# Patient Record
Sex: FEMALE | Race: WHITE | Employment: OTHER | URBAN - METROPOLITAN AREA
[De-identification: names, ages, dates, MRNs, and addresses within clinical notes are randomized per-mention and may not be internally consistent; named-entity substitution may affect disease eponyms.]

---

## 2021-03-22 PROBLEM — O24.410 DIET CONTROLLED GESTATIONAL DIABETES MELLITUS (GDM), ANTEPARTUM: Status: ACTIVE | Noted: 2021-03-22

## 2021-03-22 PROBLEM — Q27.0 SINGLE UMBILICAL ARTERY: Status: ACTIVE | Noted: 2021-03-22

## 2021-04-01 PROBLEM — O36.63X1 LARGE FOR GESTATIONAL AGE FETUS AFFECTING MANAGEMENT OF MOTHER, ANTEPARTUM, THIRD TRIMESTER, FETUS 1: Status: ACTIVE | Noted: 2021-04-01

## 2021-04-02 ENCOUNTER — HOSPITAL ENCOUNTER (OUTPATIENT)
Dept: DIABETES SERVICES | Age: 31
Setting detail: THERAPIES SERIES
Discharge: HOME | End: 2021-04-02
Payer: COMMERCIAL

## 2021-04-02 VITALS — WEIGHT: 150 LBS

## 2021-04-02 PROCEDURE — G0108 DIAB MANAGE TRN  PER INDIV: HCPCS

## 2021-04-02 NOTE — PROGRESS NOTES
Diabetes Self-Management Education Record    Participant Name: Carlean Schwab  Referring Provider: No primary care provider on file. Assessment/Evaluation Ratings:  1=Needs Instruction   4=Demonstrates Understanding/Competency  2=Needs Review   NC=Not Covered    3=Comprehends Key Points  N/A=Not Applicable  Topics/Learning Objectives Pre-session Assess Date:  Instructor initials/date  4/2/2021 CS Instr. Date    Instructor initials/date  4/2/2021 CS Follow-up Post- session Eval Comments   Diabetes disease process & Treatment process:   -Define type of diabetes in simple terms.  - Describe the ABCs of  diabetes management  -Identify own type of diabetes  -Identify lifestyle changes/treatment options  -other:  1 [x] All     []  []  []  []  []  3 4/2/2021 CS  Newly dx GDM, 36.5 weeks, due 4/26/2021  Family history   Developing strategies for Healthy coping/psychosocial issues:    -Describe feelings about living with diabetes  -Identify coping strategies and sources of stress  -Identify support needed & support network available   1 [x] All     []  []  []    []  3   4/2/2021 CS Pt states  and family as support. Prevention, detection & treatment of Chronic complications:    -Identify the prevention, detection and treatment for complications including immunizations, preventive eye, foot, dental and renal exams as indicated per the participant's duration of diabetes and health status.  -Define the natural course of diabetes and the relationship of blood glucose levels to long term complications of diabetes.   1 [x] All     []            []  3    Prevention, detection & treatment of acute complications:    -State the causes,signs & symptoms of hyper & hypoglycemia, and prevention & treatment strategies.   -Describe sick day guidelines  DKA /indications for ketone testing &  when to call physician  1 [] All     [x]      []    3   4/2/2021 CS - Pt states no symptoms of hyper/hypoglycemia       -Identify severe weather/situation crisis  & diabetes supplies management  []      Using medications safely:   -State effects of diabetes medicines on blood glucose levels;  -List diabetes medication taken, action & side effects  [] All     []  []      Insulin/Injectables/glucagon  -Name appropriate injection sites; proper storage; supplies needed;     []       Demonstrate proper technique  []      Monitoring blood glucose, interpreting and using results:   -Identify the purpose of testing   -Identify recommended & personal blood glucose targets & HgbA1C target levels  -State the Importance of logging blood glucose levels for pattern recognition;   -State benefits of reading/using pt generated health data  -Verbalize safe lancet disposal 1 [x] All     []  []    []  []  []  4  4/2/2021 CS  Pt states that she is monitoring 4x/day. Reviewed log sheets. Pt within target range at fasting and 2 hours after meals.    -Demonstrate proper testing technique  []      Incorporating physical activity into lifestyle:   -State effect of exercise on blood glucose levels;   -State benefits of regular exercise;   -Define safety considerations/food choices if needed.  -Describe contraindications/maintenance of activity. 1 [x] All     []  []    []  []  4 4/2/2021 CS Pt states that she walks at least 5 days/week for 45 minutes/day. Incorporating nutritional management into lifestyle:   -Describe effect of type, amount & timing of food on blood glucose  -Describe methods for preparing and planning healthy meals  -Correctly read food labels  -Name 3 foods high in Carbohydrate 1 [x] All       []    []    []  []  4 4/2/2021 CS Food recall reveals patient does not drink sugary beverages, eats 3 meal/day and may have between meal snacks. Pt able to name carbohydrate containing foods and read food labels for carbohydrate content.     Emailed patient supporting materials.    -Plan a carbohydrate-controlled meal based on individualized meal plan  -Demonstrate CHO counting/portion control  1 [x]  [x]  4 4/2/2021 CS Instructed patient on 1900 calorie carbohydrate controlled meal plan: 9 oz lean protein, 6 servings fat, 14 carbohydrate choices/day: 2 choices breakfast, am, pm, HS snack, 3 choices lunch and dinner. Emailed patient supporting materials. Pt states that she received. Pt able to plan menu items using meal planning food lists. Developing strategies for problem solving to promote health/change behavior. -Identify 7 self-care behaviors; Personal health risk factors; Benefits, challenges & strategies for behavioral change and set an individualized goal selection. 1       [x]  4  4/2/2021 CS  [x]Nutrition  [x]Monitoring  []Exercise  []Medication  []Other     Identified Barriers to learning/adherence to self management plan:    None  []  other    Instruction Method:  Lecture/Discussion, Handouts and Return demonstration     Supporting Education Materials/Equipment Provided: Meal Plan and Nutritional Packet   []Persian materials       [] services     []Other:      Encounter Type Date Attended Start Time End Time Comments No Show Dates   Assessment 4/2/2021  1300 1310   telehealth    Session 1         Session 2        1:1 DSMES          In person Follow-up         Gestational Diabetes 4/2/2021 1310 1340  telehealth    Individual MNT        Meter Instrx        Insulin Instrx           Additional Comments: [] Pt seen individually due to Covid-19 Safety precautions and no group session available.         Date:   Follow-up goal attainment based on patients initial DSMES goal    Dr Notified by [] EMR []Fax        []Post class Hgb A1C  []Medication compliance   []Plate method/meal plan compliance   []Able to state the number of Carbohydrate servings eaten at B,L,D   []Testing blood glucose as prescribed by PCP   []Exercise Routine   []Other:   []Other:     []Patient lost to follow-up  Dr Notified by []EMR []Fax     Personal Support Plan: [x] Keep all scheduled doctor appointments   [] Make and keep appointments with specialists (foot, eye, dentist) as recommended   [] Consult my pharmacist about all new medications or to ask any medication questions   [] Get tested for sleep apnea   [] Seek help for:   [] Make an appointment with:   [] Attend smoking cessation classes or call 1-800-QUIT-NOW  [] Attend Diabetes Support Group   [] Use diabetes magazines, books, or credible web-sites like the ADA for more information  [] Increase exercise at home or join an exercise program:   [] Other:

## 2021-04-02 NOTE — LETTER
[facility])  - Diabetes Education    2021     Re:     Joshua Horowitz  :  1990    Dear MICHELLE Norris-EVELIO           Thank you for referring your patient, Patrice Hadley, for diabetes education. Your patient was seen on 2021,and has completed their personalized initial comprehensive education plan. The education plan included the following topics: Disease Process, Nutrition, Exercise, Blood Glucose Monitoring, Acute/chronic complications, Behavioral and Lifestyle Change, Healthy Coping and goal setting. The following services were also completed:    [x]  Diet instruction on: 1900 calorie carbohydrate controlled meal plan  9 oz lean protein, 6 servings fat, 14 carbohydrate choices/day: 2 choices breakfast, am, pm, HS snack, 3 choices lunch and dinner. Upon completion of these sessions, the diabetes teaching team made the following evaluation of your patient's progress:          ASSESSMENT    [x]  very attentive to teaching  [x]  answered questions appropriately when asked  [x]  seems able to apply concepts to daily lifestyle  [x]  seems motivated to do well  [x]  verbalized an understanding of meal plan  [x]  expresses an intent to comply with meal plan  [x]  worked out meal timing adjustment according to work/schedule/lifestyle      GOAL  [x]  To count carbohydrate servings at each meal as instructed.   [x]  to measure blood glucose   []  to inject insulin in the stomach area  [x]  to log glucose results and bring to physicians office at each scheduled appointment      DIABETES SELF-MANAGEMENT SUPPORT PLAN/REFERRALS:    []  Prescription Assistance  []  [facility]  []  [facility]  []  Financial Counselor  []    []  Dentist  []  Keep all scheduled Dr appointments  []  Smoking Cessation Classes    Thank you for referring this patient to our program.  Please do not hesitate to call if you have any questions at ( (OZZIE or FRANK) or [phone] ([facility]).         Sincerely,    [facility]) Diabetes Education Department  American Diabetes Association Recognized DSMES Program

## 2021-04-02 NOTE — PROGRESS NOTES
HIPPA Consent completed verbally for visit and copy mailed to patient. Visit provided via telehealth due to Covid-19 social distancing measures. This virtual group/initial visit was conducted via:       Video       Patient's Location:   [x] Home    [] Other        Provider Location ([facility]/State):       [x][facility]    [][place], [place], was evaluated through a synchronous (real-time) audio-video encounter. The patient (or guardian if applicable) is aware that this is a billable service. Verbal consent to proceed has been obtained today. The visit was conducted pursuant to the emergency declaration under the [facility], [address] [place] [address] authority and the [place] and [employer] General Act. Patient identification was verified, and a caregiver was present when appropriate. The patient was located in a state where the provider was credentialed to provide care.

## 2021-04-19 ENCOUNTER — HOSPITAL ENCOUNTER (INPATIENT)
Age: 31
LOS: 1 days | Discharge: HOME | End: 2021-04-20
Attending: OBSTETRICS & GYNECOLOGY | Admitting: OBSTETRICS & GYNECOLOGY
Payer: COMMERCIAL

## 2021-04-19 PROBLEM — Z3A.39 39 WEEKS GESTATION OF PREGNANCY: Status: ACTIVE | Noted: 2021-04-19

## 2021-04-19 LAB
ABO/RH: NORMAL
ANTIBODY SCREEN: NORMAL
HCT VFR BLD CALC: 34.8 % (ref 34–48)
HEMOGLOBIN: 10.7 G/DL (ref 11.5–15.5)
MCH RBC QN AUTO: 25.8 PG (ref 26–35)
MCHC RBC AUTO-ENTMCNC: 30.7 % (ref 32–34.5)
MCV RBC AUTO: 84.1 FL (ref 80–99.9)
PDW BLD-RTO: 13.5 FL (ref 11.5–15)
PLATELET # BLD: 496 E9/L (ref 130–450)
PMV BLD AUTO: 9.6 FL (ref 7–12)
RBC # BLD: 4.14 E12/L (ref 3.5–5.5)
WBC # BLD: 13.9 E9/L (ref 4.5–11.5)

## 2021-04-19 PROCEDURE — 85027 COMPLETE CBC AUTOMATED: CPT

## 2021-04-19 PROCEDURE — 86900 BLOOD TYPING SEROLOGIC ABO: CPT

## 2021-04-19 PROCEDURE — 2580000003 HC RX 258: Performed by: OBSTETRICS & GYNECOLOGY

## 2021-04-19 PROCEDURE — 6360000002 HC RX W HCPCS: Performed by: OBSTETRICS & GYNECOLOGY

## 2021-04-19 PROCEDURE — 86901 BLOOD TYPING SEROLOGIC RH(D): CPT

## 2021-04-19 PROCEDURE — 6360000002 HC RX W HCPCS

## 2021-04-19 PROCEDURE — 7200000001 HC VAGINAL DELIVERY

## 2021-04-19 PROCEDURE — 36415 COLL VENOUS BLD VENIPUNCTURE: CPT

## 2021-04-19 PROCEDURE — 1220000000 HC SEMI PRIVATE OB R&B

## 2021-04-19 PROCEDURE — 0KQM0ZZ REPAIR PERINEUM MUSCLE, OPEN APPROACH: ICD-10-PCS | Performed by: OBSTETRICS & GYNECOLOGY

## 2021-04-19 PROCEDURE — 86850 RBC ANTIBODY SCREEN: CPT

## 2021-04-19 PROCEDURE — 6370000000 HC RX 637 (ALT 250 FOR IP): Performed by: OBSTETRICS & GYNECOLOGY

## 2021-04-19 RX ORDER — SODIUM CHLORIDE 0.9 % (FLUSH) 0.9 %
5-40 SYRINGE (ML) INJECTION EVERY 12 HOURS SCHEDULED
Status: DISCONTINUED | OUTPATIENT
Start: 2021-04-19 | End: 2021-04-20 | Stop reason: HOSPADM

## 2021-04-19 RX ORDER — SODIUM CHLORIDE, SODIUM LACTATE, POTASSIUM CHLORIDE, CALCIUM CHLORIDE 600; 310; 30; 20 MG/100ML; MG/100ML; MG/100ML; MG/100ML
INJECTION, SOLUTION INTRAVENOUS CONTINUOUS
Status: DISCONTINUED | OUTPATIENT
Start: 2021-04-19 | End: 2021-04-19

## 2021-04-19 RX ORDER — HYDROCODONE BITARTRATE AND ACETAMINOPHEN 5; 325 MG/1; MG/1
1 TABLET ORAL EVERY 4 HOURS PRN
Status: DISCONTINUED | OUTPATIENT
Start: 2021-04-19 | End: 2021-04-20 | Stop reason: HOSPADM

## 2021-04-19 RX ORDER — SODIUM CHLORIDE 0.9 % (FLUSH) 0.9 %
5-40 SYRINGE (ML) INJECTION PRN
Status: DISCONTINUED | OUTPATIENT
Start: 2021-04-19 | End: 2021-04-20 | Stop reason: HOSPADM

## 2021-04-19 RX ORDER — ONDANSETRON 2 MG/ML
4 INJECTION INTRAMUSCULAR; INTRAVENOUS EVERY 6 HOURS PRN
Status: DISCONTINUED | OUTPATIENT
Start: 2021-04-19 | End: 2021-04-19

## 2021-04-19 RX ORDER — ACETAMINOPHEN 650 MG
TABLET, EXTENDED RELEASE ORAL
Status: DISCONTINUED
Start: 2021-04-19 | End: 2021-04-19

## 2021-04-19 RX ORDER — LIDOCAINE HYDROCHLORIDE 10 MG/ML
INJECTION, SOLUTION INFILTRATION; PERINEURAL
Status: DISCONTINUED
Start: 2021-04-19 | End: 2021-04-19

## 2021-04-19 RX ORDER — METHYLERGONOVINE MALEATE 0.2 MG/ML
200 INJECTION INTRAVENOUS ONCE
Status: COMPLETED | OUTPATIENT
Start: 2021-04-19 | End: 2021-04-19

## 2021-04-19 RX ORDER — PRENATAL WITH FERROUS FUM AND FOLIC ACID 3080; 920; 120; 400; 22; 1.84; 3; 20; 10; 1; 12; 200; 27; 25; 2 [IU]/1; [IU]/1; MG/1; [IU]/1; MG/1; MG/1; MG/1; MG/1; MG/1; MG/1; UG/1; MG/1; MG/1; MG/1; MG/1
1 TABLET ORAL DAILY
Status: DISCONTINUED | OUTPATIENT
Start: 2021-04-19 | End: 2021-04-20 | Stop reason: HOSPADM

## 2021-04-19 RX ORDER — FERROUS SULFATE 325(65) MG
325 TABLET ORAL 2 TIMES DAILY WITH MEALS
Status: DISCONTINUED | OUTPATIENT
Start: 2021-04-19 | End: 2021-04-20 | Stop reason: HOSPADM

## 2021-04-19 RX ORDER — IBUPROFEN 800 MG/1
800 TABLET ORAL EVERY 8 HOURS
Status: DISCONTINUED | OUTPATIENT
Start: 2021-04-19 | End: 2021-04-20 | Stop reason: HOSPADM

## 2021-04-19 RX ORDER — DOCUSATE SODIUM 100 MG/1
100 CAPSULE, LIQUID FILLED ORAL 2 TIMES DAILY
Status: DISCONTINUED | OUTPATIENT
Start: 2021-04-19 | End: 2021-04-20 | Stop reason: HOSPADM

## 2021-04-19 RX ORDER — SIMETHICONE 80 MG
80 TABLET,CHEWABLE ORAL 4 TIMES DAILY
Status: DISCONTINUED | OUTPATIENT
Start: 2021-04-19 | End: 2021-04-20 | Stop reason: HOSPADM

## 2021-04-19 RX ORDER — MODIFIED LANOLIN
OINTMENT (GRAM) TOPICAL PRN
Status: DISCONTINUED | OUTPATIENT
Start: 2021-04-19 | End: 2021-04-20 | Stop reason: HOSPADM

## 2021-04-19 RX ORDER — HYDROCODONE BITARTRATE AND ACETAMINOPHEN 5; 325 MG/1; MG/1
2 TABLET ORAL EVERY 4 HOURS PRN
Status: DISCONTINUED | OUTPATIENT
Start: 2021-04-19 | End: 2021-04-20 | Stop reason: HOSPADM

## 2021-04-19 RX ORDER — ACETAMINOPHEN 325 MG/1
650 TABLET ORAL EVERY 4 HOURS PRN
Status: DISCONTINUED | OUTPATIENT
Start: 2021-04-19 | End: 2021-04-20 | Stop reason: HOSPADM

## 2021-04-19 RX ORDER — SODIUM CHLORIDE 9 MG/ML
25 INJECTION, SOLUTION INTRAVENOUS PRN
Status: DISCONTINUED | OUTPATIENT
Start: 2021-04-19 | End: 2021-04-20 | Stop reason: HOSPADM

## 2021-04-19 RX ORDER — SODIUM CHLORIDE, SODIUM LACTATE, POTASSIUM CHLORIDE, CALCIUM CHLORIDE 600; 310; 30; 20 MG/100ML; MG/100ML; MG/100ML; MG/100ML
INJECTION, SOLUTION INTRAVENOUS CONTINUOUS
Status: DISCONTINUED | OUTPATIENT
Start: 2021-04-19 | End: 2021-04-20 | Stop reason: HOSPADM

## 2021-04-19 RX ADMIN — SODIUM CHLORIDE, POTASSIUM CHLORIDE, SODIUM LACTATE AND CALCIUM CHLORIDE: 600; 310; 30; 20 INJECTION, SOLUTION INTRAVENOUS at 06:21

## 2021-04-19 RX ADMIN — METFORMIN HYDROCHLORIDE 1 TABLET: 500 TABLET, EXTENDED RELEASE ORAL at 10:12

## 2021-04-19 RX ADMIN — METHYLERGONOVINE MALEATE 200 MCG: 0.2 INJECTION, SOLUTION INTRAMUSCULAR; INTRAVENOUS at 05:57

## 2021-04-19 RX ADMIN — DOCUSATE SODIUM 100 MG: 100 CAPSULE, LIQUID FILLED ORAL at 10:12

## 2021-04-19 RX ADMIN — Medication 909 ML: at 05:55

## 2021-04-19 RX ADMIN — BENZOCAINE AND LEVOMENTHOL: 200; 5 SPRAY TOPICAL at 10:12

## 2021-04-19 RX ADMIN — DOCUSATE SODIUM 100 MG: 100 CAPSULE, LIQUID FILLED ORAL at 20:42

## 2021-04-19 RX ADMIN — Medication: at 10:12

## 2021-04-19 RX ADMIN — ACETAMINOPHEN 650 MG: 325 TABLET ORAL at 10:12

## 2021-04-19 RX ADMIN — ACETAMINOPHEN 650 MG: 325 TABLET ORAL at 16:35

## 2021-04-19 RX ADMIN — Medication 87.3 MILLI-UNITS/MIN: at 06:06

## 2021-04-19 RX ADMIN — SODIUM CHLORIDE, PRESERVATIVE FREE 10 ML: 5 INJECTION INTRAVENOUS at 20:42

## 2021-04-19 RX ADMIN — ACETAMINOPHEN 650 MG: 325 TABLET ORAL at 20:42

## 2021-04-19 SDOH — HEALTH STABILITY: MENTAL HEALTH: HOW OFTEN DO YOU HAVE A DRINK CONTAINING ALCOHOL?: NEVER

## 2021-04-19 ASSESSMENT — PAIN SCALES - GENERAL
PAINLEVEL_OUTOF10: 9
PAINLEVEL_OUTOF10: 1
PAINLEVEL_OUTOF10: 4

## 2021-04-19 NOTE — PROGRESS NOTES
viable  boy at 36. [place] cried and suctioned at the perineum. Placed on mother abdomen for delayed cord clamping. Placed skin to skin with mother. apgars 9/9.  Mother and  stable at this time

## 2021-04-19 NOTE — FLOWSHEET NOTE
Pt. states does not have to void and does not want to go to bathroom at this time. Sat at side of bed and tolerated well,ambulated jto wheelchair and tolerated well. Transferred to [facility]. Reported off to VAUGHN Mckeon.

## 2021-04-19 NOTE — LACTATION NOTE
Experienced breastfeeding mom stated that breastfeeding is going great and that she has no questions at this time. Encouraged her to call with questions.   Leta

## 2021-04-19 NOTE — L&D DELIVERY SUMMARY NOTE
Vaginal Delivery Note    Details of Procedure: The patient is a 31 y.o. female at 39w0d   OB History        4    Para   1    Term   1            AB   2    Living   1       SAB   2    TAB        Ectopic        Molar        Multiple        Live Births   1             who was admitted for active phase labor. She received the following interventions: none She was known to be GBS negative and did not receive antibiotic prophylaxis. The patient progressed well,did not receive an epidural, became complete and started to push. After pushing for 4 times the fetal head was at the perineum, nose and mouth suctioned with bulb suction and the rest of the infant delivered atraumatically, placed on mother abdomen. Cord was clamped and cut and infant handed off to the waiting nurse for evaluation. The delivery of the placenta was spontaneous. The perineum and vagina were explored and a second degree laceration was repaired in standard fashion. Male infant delivered at 36 with Apgars of 9 and 9    Anesthesia:  none    Estimated blood loss:  300ml    Specimen:  Placenta sent to pathology     Cord blood sent Yes    Complications:  none    Condition:  infant stable to general nursery    Ron Vaughan M.D., FACOG

## 2021-04-19 NOTE — FLOWSHEET NOTE
Pt. States feelss nauseated and a little lightheaded. States shes hungry. Ginger ale and crackers given. Uterus massaged with no gush  And 1 below U. Saturated ice pack since delivery.

## 2021-04-19 NOTE — PROGRESS NOTES
Pitocin infused and stopped. lr stopped. Up to bathroom with help.voided qs. pericare given with instructions. refusing ice at this time. Dermoplast spray given. Lochia rna. Tolerated activity well. Medicated with tylenol per pt request.instructed to call for help as needed.

## 2021-04-19 NOTE — H&P
CHIEF COMPLAINT:   spontaneous premature rupture of the membranesfollowed by spontaneous onset of labor      HISTORY OF PRESENT ILLNESS:    The patient is a 31 y.o. female [id number], Patient's last menstrual period was 2020.,  at 39w0d. OB History        4    Para   1    Term   1            AB   2    Living   1       SAB   2    TAB        Ectopic        Molar        Multiple        Live Births   1            Patient presents with a chief complaint as above and is being admitted for evaluation    Estimated Due Date: Estimated Date of Delivery: 21    PRENATAL CARE:  Complicated by:   Patient Active Problem List   Diagnosis Code    Single umbilical artery M48.3    Diet controlled gestational diabetes mellitus (GDM), antepartum O24.410    Large for gestational age fetus affecting management of mother, antepartum, third trimester, fetus 1 O44.62X2    44 weeks gestation of pregnancy Z3A.39       PAST OB HISTORY  OB History        4    Para   1    Term   1            AB   2    Living   1       SAB   2    TAB        Ectopic        Molar        Multiple        Live Births   1                Past Medical History:    No past medical history on file. Past Surgical History:    No past surgical history on file. Social History:    TOBACCO:   reports that she has never smoked. She does not have any smokeless tobacco history on file. ETOH:   reports no history of alcohol use. DRUGS:   has no history on file for drug. Family History:   No family history on file. Medications Prior to Admission:  Medications Prior to Admission: Lancets MISC, Test glucose four times a day  blood glucose monitor strips, Test glucose 4x a day.   glucose monitoring kit (FREESTYLE) monitoring kit, As directed  Prenatal Vit-Fe Sulfate-FA (PRENATAL MULTIVIT-IRON PO), Take by mouth    Allergies:  Macadamia nut oil    Review of Systems:   Constitutional : No fever, no chills   HEENT: No headache, no visual changes, no rhinorrhea, no sore throat   Cardiovascular : No pain, no palpitations, no edema   Respiratory : No pain, no shortness of breath   Gastrointestinal : No N/V, no D/C, no abdominal pain   Genitourinary : No dysuria, hematuria and no incontinence   Musculoskeletal : No myalgia, No back pain  Neurological : No numbness, no tingling, no tremors. No history of seizures  All other systems were reported as negative. PHYSICAL EXAM:    General appearance:  awake, alert, cooperative, no apparent distress, and appears stated age  Neurologic:  Awake, alert, oriented to name, place and time. Lungs:  No increased work of breathing, good air exchange, clear to auscultation bilaterally, no crackles or wheezing  Heart:  Normal apical impulse, regular rate and rhythm, normal S1 and S2, no S3 or S4, and no murmur noted  Abdomen:  Her uterus is gravid. She had no complaint of abdominal pain or tenderness. The fetus is in the cephalic presentation. No contractions palpated. Fetal heart rate:  Fetal heart tracing is reassuring with a normal baseline, variability, and accelerations. No decelerations detected. Pelvis:  External Genitalia: General appearance; normal, Hair distribution; normal, Lesions absent  Cervix: see admitting nurse's exam, VE not repeated. Extremities: Mild peripheral edema is noted. /62   Pulse 93   Temp 98.7 °F (37.1 °C) (Oral)   Resp 18   Ht 5' 8\" (1.727 m)   Wt 151 lb (68.5 kg)   LMP 07/20/2020   BMI 22.96 kg/m²     General Labs:    CBC: No results found for: WBC, RBC, HGB, HCT, MCV, RDW, PLT  CMP:  No results found for: NA, K, CL, CO2, BUN, PROT, ALB  U/A:  No components found for: Kindra Annabel, USPGRAV, UPH, UPROTEIN, UGLUCOSE, UKETONE, UBILI, UBLOOD, UNITRITE, UUROBIL, [place], USQEPI, [place], UWBC, [place], [place]ch[place], UHYALINE    ASSESSMENT AND PLAN:  spontaneous premature rupture of the membranesfollowed by spontaneous onset of labor at 44 0/7 weeks GA.   Start standard protocol for patient in labor   Epidural on request

## 2021-04-19 NOTE — PROGRESS NOTES
Admitted to 308 via wheelchair from L and D.iv lr infusing with pitocin infusing piggyback. Instructed to call for help when ambulating. Phone within reach. Instructed on  on paperwork and visiting policy. Refusing tdap vaccine for self and Hep B for the baby.

## 2021-04-20 VITALS
BODY MASS INDEX: 22.88 KG/M2 | HEIGHT: 68 IN | OXYGEN SATURATION: 99 % | RESPIRATION RATE: 16 BRPM | WEIGHT: 151 LBS | DIASTOLIC BLOOD PRESSURE: 75 MMHG | HEART RATE: 67 BPM | SYSTOLIC BLOOD PRESSURE: 117 MMHG | TEMPERATURE: 97.7 F

## 2021-04-20 LAB
HCT VFR BLD CALC: 27.8 % (ref 34–48)
HEMOGLOBIN: 8.6 G/DL (ref 11.5–15.5)

## 2021-04-20 PROCEDURE — 85014 HEMATOCRIT: CPT

## 2021-04-20 PROCEDURE — 36415 COLL VENOUS BLD VENIPUNCTURE: CPT

## 2021-04-20 PROCEDURE — 85018 HEMOGLOBIN: CPT

## 2021-04-20 PROCEDURE — 6370000000 HC RX 637 (ALT 250 FOR IP): Performed by: OBSTETRICS & GYNECOLOGY

## 2021-04-20 RX ORDER — IBUPROFEN 800 MG/1
800 TABLET ORAL EVERY 8 HOURS
Qty: 30 TABLET | Refills: 0 | Status: SHIPPED | OUTPATIENT
Start: 2021-04-20

## 2021-04-20 RX ADMIN — METFORMIN HYDROCHLORIDE 1 TABLET: 500 TABLET, EXTENDED RELEASE ORAL at 09:01

## 2021-04-20 RX ADMIN — DOCUSATE SODIUM 100 MG: 100 CAPSULE, LIQUID FILLED ORAL at 09:01

## 2021-04-20 RX ADMIN — ACETAMINOPHEN 650 MG: 325 TABLET ORAL at 09:01

## 2021-04-20 RX ADMIN — FERROUS SULFATE TAB 325 MG (65 MG ELEMENTAL FE) 325 MG: 325 (65 FE) TAB at 09:02

## 2021-04-20 ASSESSMENT — PAIN DESCRIPTION - PROGRESSION: CLINICAL_PROGRESSION: NOT CHANGED

## 2021-04-20 NOTE — PROGRESS NOTES
[employer] [place] Hearing screening results were discussed with parent. Questions answered. Brochure given to parent. Advised to monitor developmental milestones and contact physician for any concerns.    Frida Staley

## 2021-04-20 NOTE — LACTATION NOTE
Experienced mom reports baby is nursing well so far. Encouraged frequent feeds to establish milk supply. Reviewed benefits and safety of skin to skin. Inst on adequate I/O and importance of keeping track of diapers at home. Instructed on signs of dehydration such as infant refusing to feed, decreased wet diapers and infant becoming listless and notify provider if these occur. Reviewed with mom the importance of notifying the physician if baby looks more jaundiced. Lactation office # given if follow-up needed, as well as other helpful resources. Encouraged to call with any concerns. Support and encouragement given.

## 2021-04-20 NOTE — PLAN OF CARE
Problem: Pain:  Goal: Pain level will decrease  Outcome: Met This Shift  Goal: Control of acute pain  Outcome: Met This Shift  Goal: Control of chronic pain  Outcome: Met This Shift     Problem: Discharge Planning:  Goal: Discharged to appropriate level of care  Outcome: Met This Shift     Problem: Body Temperature -  Risk of, Imbalanced  Goal: Ability to maintain a body temperature in the normal range will improve to within specified parameters  Outcome: Met This Shift     Problem: Breastfeeding - Ineffective:  Goal: Effective breastfeeding  Outcome: Met This Shift  Goal: Infant weight gain appropriate for age will improve to within specified parameters  Outcome: Met This Shift  Goal: Ability to achieve and maintain adequate urine output will improve to within specified parameters  Outcome: Met This Shift     Problem: Infant Care:  Goal: Will show no infection signs and symptoms  Outcome: Met This Shift     Problem: [place] Screening:  Goal: Serum bilirubin within specified parameters  Outcome: Met This Shift  Goal: Neurodevelopmental maturation within specified parameters  Outcome: Met This Shift  Goal: Ability to maintain appropriate glucose levels will improve to within specified parameters  Outcome: Met This Shift  Goal: Circulatory function within specified parameters  Outcome: Met This Shift     Problem: Parent-Infant Attachment - Impaired:  Goal: Ability to interact appropriately with  will improve  Outcome: Met This Shift     Problem: VAGINAL DELIVERY - RECOVERY AND POST PARTUM  Goal: Vital signs are medically acceptable  Outcome: Met This Shift  Goal: Patient will remain free of falls  Outcome: Met This Shift  Goal: Fundus firm at midline  Outcome: Met This Shift  Goal: Moderate rubra without clots, no purulent discharge, no foul smelling lochia  Outcome: Met This Shift  Goal: Empties bladder  Outcome: Met This Shift  Goal: Verbalizes understanding of normal bowel function resumption  Outcome:  Met This Shift  Goal: Edema will be absent or minimal  Outcome: Met This Shift  Goal: Breasts are soft with nipple integrity intact  Outcome: Met This Shift  Goal: Demonstrates appropriate breast feeding techniques  Outcome: Met This Shift  Goal: Appropriate behavior observed  Outcome: Met This Shift  Goal: Positive Mother-Baby interactions are observed  Outcome: Met This Shift  Goal: Perineum intact without discharge or hematoma  Outcome: Met This Shift  Goal: Ambulates independently  Outcome: Met This Shift     Problem: PAIN  Goal: Patient's pain/discomfort is manageable  Outcome: Met This Shift     Problem: KNOWLEDGE DEFICIT  Goal: Patient/S.O. demonstrates understanding of disease process, treatment plan, medications, and discharge instructions.   Outcome: Met This Shift

## 2021-04-20 NOTE — DISCHARGE SUMMARY
Obstetrical Discharge Form        Patient ID:  December Kimberly Ibarra  [id number]  31 y.o.  1990    Admit date: 2021    Discharge date: 2021     Admitting Physician: Ryanne Barr    Discharge Diagnoses: 44 weeks gestation of pregnancy [Z3A.39]    Final Diagnosis:   Active Problems:    39 weeks gestation of pregnancy  Resolved Problems:    * No resolved hospital problems. *      Discharged Condition: good      Gestational Age:39w0d    Antepartum complications: GDM diet controlled    Date of Delivery: 21      Type of Delivery: vaginal, spontaneous    Delivered By: Gisselle Collins MD         Baby:     Information for the patient's :  Camron Cosme December [[id number]]          Anesthesia: None    Intrapartum complications: None    Feeding method: breast    Hospital Course: Uneventful.   Patient recovered well without any issues     Discharged Condition: stable to home    Discharge Medication:    Kelly Ribeiro   Home Medication Instructions BLJ:[id number]    Printed on:21 [id number]   Medication Information                      ibuprofen (ADVIL;MOTRIN) 800 MG tablet  Take 1 tablet by mouth every 8 hours             Prenatal Vit-Fe Sulfate-FA (PRENATAL MULTIVIT-IRON PO)  Take by mouth                  Postpartum complications: none    Note that over 30 minutes was spent in preparing discharge papers, discussing discharge with patient, medication review, etc.    Discharge Date: 2021    Plan:   Follow up in 6 week(s)

## 2021-04-20 NOTE — PROGRESS NOTES
Postpartum Day 1: Vaginal Delivery    The patient feels well. Pain is well controlled with current medications. Baby is feeding via breast.     Objective:        Vitals:    04/20/21 0707   BP: 117/75   Pulse: 67   Resp: 16   Temp: 97.7 °F (36.5 °C)   SpO2: 99%         Lab Results   Component Value Date    WBC 13.9 (H) 04/19/2021    HGB 8.6 (L) 04/20/2021    HCT 27.8 (L) 04/20/2021    MCV 84.1 04/19/2021     (H) 04/19/2021       General:    alert, appears stated age and cooperative   Lochia:  appropriate   Uterine    firm   DVT Evaluation:  No evidence of DVT seen on physical exam.     Assessment:     Status post Vaginal Delivery. good    Plan:     Continue current care.

## 2023-05-11 PROBLEM — Z3A.39 39 WEEKS GESTATION OF PREGNANCY: Status: RESOLVED | Noted: 2021-04-19 | Resolved: 2023-05-11

## 2023-05-11 PROBLEM — O36.63X1 LARGE FOR GESTATIONAL AGE FETUS AFFECTING MANAGEMENT OF MOTHER, ANTEPARTUM, THIRD TRIMESTER, FETUS 1: Status: RESOLVED | Noted: 2021-04-01 | Resolved: 2023-05-11

## 2023-05-11 PROBLEM — O24.410 DIET CONTROLLED GESTATIONAL DIABETES MELLITUS (GDM), ANTEPARTUM: Status: RESOLVED | Noted: 2021-03-22 | Resolved: 2023-05-11

## 2023-05-11 PROBLEM — Q27.0 SINGLE UMBILICAL ARTERY: Status: RESOLVED | Noted: 2021-03-22 | Resolved: 2023-05-11

## 2024-05-10 ENCOUNTER — HOSPITAL ENCOUNTER (EMERGENCY)
Age: 34
Discharge: HOME | End: 2024-05-10

## 2024-05-10 ENCOUNTER — HOSPITAL ENCOUNTER (OUTPATIENT)
Dept: ULTRASOUND IMAGING | Age: 34
End: 2024-05-10

## 2024-05-10 ENCOUNTER — HOSPITAL ENCOUNTER (OUTPATIENT)
Age: 34
Discharge: HOME | End: 2024-05-10

## 2024-05-10 VITALS
OXYGEN SATURATION: 100 % | WEIGHT: 140 LBS | HEART RATE: 95 BPM | SYSTOLIC BLOOD PRESSURE: 112 MMHG | RESPIRATION RATE: 18 BRPM | TEMPERATURE: 98.1 F | DIASTOLIC BLOOD PRESSURE: 69 MMHG | HEIGHT: 68 IN | BODY MASS INDEX: 21.22 KG/M2

## 2024-05-10 DIAGNOSIS — M79.662 PAIN OF LEFT CALF: Primary | ICD-10-CM

## 2024-05-10 DIAGNOSIS — M79.662 PAIN OF LEFT CALF: ICD-10-CM

## 2024-05-10 DIAGNOSIS — R06.02 SHORTNESS OF BREATH: ICD-10-CM

## 2024-05-10 LAB
ANION GAP SERPL CALCULATED.3IONS-SCNC: 11 MMOL/L (ref 7–16)
BASOPHILS # BLD: 0.04 K/UL (ref 0–0.2)
BASOPHILS NFR BLD: 0 % (ref 0–2)
BUN SERPL-MCNC: 10 MG/DL (ref 6–20)
CALCIUM SERPL-MCNC: 8.9 MG/DL (ref 8.6–10.2)
CHLORIDE SERPL-SCNC: 99 MMOL/L (ref 98–107)
CO2 SERPL-SCNC: 25 MMOL/L (ref 22–29)
CREAT SERPL-MCNC: 0.6 MG/DL (ref 0.5–1)
D DIMER: 969 NG/ML DDU (ref 0–230)
EOSINOPHIL # BLD: 0.11 K/UL (ref 0.05–0.5)
EOSINOPHILS RELATIVE PERCENT: 1 % (ref 0–6)
ERYTHROCYTE [DISTWIDTH] IN BLOOD BY AUTOMATED COUNT: 12.6 % (ref 11.5–15)
GFR, ESTIMATED: >90 ML/MIN/1.73M2
GLUCOSE SERPL-MCNC: 103 MG/DL (ref 74–99)
HCT VFR BLD AUTO: 30.1 % (ref 34–48)
HGB BLD-MCNC: 9.9 G/DL (ref 11.5–15.5)
IMM GRANULOCYTES # BLD AUTO: 0.11 K/UL (ref 0–0.58)
IMM GRANULOCYTES NFR BLD: 1 % (ref 0–5)
LYMPHOCYTES NFR BLD: 2.35 K/UL (ref 1.5–4)
LYMPHOCYTES RELATIVE PERCENT: 24 % (ref 20–42)
MAGNESIUM SERPL-MCNC: 2 MG/DL (ref 1.6–2.6)
MCH RBC QN AUTO: 28.4 PG (ref 26–35)
MCHC RBC AUTO-ENTMCNC: 32.9 G/DL (ref 32–34.5)
MCV RBC AUTO: 86.5 FL (ref 80–99.9)
MONOCYTES NFR BLD: 0.95 K/UL (ref 0.1–0.95)
MONOCYTES NFR BLD: 10 % (ref 2–12)
NEUTROPHILS NFR BLD: 63 % (ref 43–80)
NEUTS SEG NFR BLD: 6.09 K/UL (ref 1.8–7.3)
PLATELET # BLD AUTO: 389 K/UL (ref 130–450)
PMV BLD AUTO: 9.1 FL (ref 7–12)
POTASSIUM SERPL-SCNC: 3.7 MMOL/L (ref 3.5–5)
RBC # BLD AUTO: 3.48 M/UL (ref 3.5–5.5)
SODIUM SERPL-SCNC: 135 MMOL/L (ref 132–146)
WBC OTHER # BLD: 9.7 K/UL (ref 4.5–11.5)

## 2024-05-10 PROCEDURE — 80048 BASIC METABOLIC PNL TOTAL CA: CPT

## 2024-05-10 PROCEDURE — 83735 ASSAY OF MAGNESIUM: CPT

## 2024-05-10 PROCEDURE — 85025 COMPLETE CBC W/AUTO DIFF WBC: CPT

## 2024-05-10 PROCEDURE — 85379 FIBRIN DEGRADATION QUANT: CPT

## 2024-05-10 PROCEDURE — 93970 EXTREMITY STUDY: CPT

## 2024-05-10 PROCEDURE — 99283 EMERGENCY DEPT VISIT LOW MDM: CPT

## 2024-05-10 ASSESSMENT — PAIN DESCRIPTION - DESCRIPTORS: DESCRIPTORS: ACHING;SORE;DULL

## 2024-05-10 ASSESSMENT — PAIN SCALES - GENERAL: PAINLEVEL_OUTOF10: 8

## 2024-05-10 ASSESSMENT — PAIN DESCRIPTION - PAIN TYPE: TYPE: ACUTE PAIN

## 2024-05-10 ASSESSMENT — PAIN DESCRIPTION - FREQUENCY: FREQUENCY: CONTINUOUS

## 2024-05-10 ASSESSMENT — PAIN - FUNCTIONAL ASSESSMENT
PAIN_FUNCTIONAL_ASSESSMENT: 0-10
PAIN_FUNCTIONAL_ASSESSMENT: ACTIVITIES ARE NOT PREVENTED

## 2024-05-10 ASSESSMENT — PAIN DESCRIPTION - LOCATION: LOCATION: LEG

## 2024-05-10 ASSESSMENT — PAIN DESCRIPTION - ONSET: ONSET: ON-GOING

## 2024-05-10 ASSESSMENT — PAIN DESCRIPTION - ORIENTATION: ORIENTATION: LEFT;LOWER;POSTERIOR

## 2024-05-10 NOTE — ED PROVIDER NOTES
Independent RONALDO Visit.     [facility] EMERGENCY DEPARTMENT  EMERGENCY DEPARTMENT ENCOUNTER      Pt Name: Patrice Charles  MRN: [id number]  Birthdate 1990  Date of evaluation: 5/10/2024  Provider: MICHELLE Oden - FIDEL  PCP: Chapis Presley DO  Note Started: 7:55 PM EDT 5/10/24    CHIEF COMPLAINT       Chief Complaint   Patient presents with    Leg Pain     Lt calf pain since yesterday am, increased pain with flexion of foot, no edema; pain has radiated to upper inner thigh    Shortness of Breath     Shortness of breath since this am       HISTORY OF PRESENT ILLNESS: 1 or more Elements   History From: Patient  Limitations to history : None    Patrice Charles is a 33 y.o. female who has no significant past medical history presents to the emergency department with complaints of a 1 day history of left posterior calf pain radiating up to her left medial thigh with shortness of breath onset this morning.  Patient is 31 weeks pregnant.  Denies any personal clotting history, no recent travel, no leg swelling.  Patient states that the leg pain started with a charley horse type pain in her calf and has been persistent since that time.  States that charley horses let up but she is having a persistent aching pain in the left calf.  No leg swelling.  Noticed this morning that she was having some difficulty taking a deep breath.  No chest pain.  States when she takes a deep breath she has a dry nonproductive cough.    Nursing Notes were all reviewed and agreed with or any disagreements were addressed in the HPI.    REVIEW OF SYSTEMS :    Positives and Pertinent negatives as per HPI.     PAST MEDICAL HISTORY/Chronic Conditions Affecting Care    has a past medical history of Thyroid fullness.     SURGICAL HISTORY   History reviewed. No pertinent surgical history.    CURRENTMEDICATIONS       Discharge Medication List as of 5/10/2024  9:07 PM        CONTINUE these medications which      DISPOSITION/PLAN     DISPOSITION Decision To Discharge 05/10/2024 09:04:40 PM    PATIENT REFERRED TO:  Chapis Presley DO  [address] [address]  [phone]    Schedule an appointment as soon as possible for a visit       Rina Miller MD  [address] [address]  [phone]    Schedule an appointment as soon as possible for a visit         DISCHARGE MEDICATIONS:  Discharge Medication List as of 5/10/2024  9:07 PM          DISCONTINUED MEDICATIONS:  Discharge Medication List as of 5/10/2024  9:07 PM               (Please note that portions of this note were completed with a voice recognition program.  Efforts were made to edit the dictations but occasionally words are mis-transcribed.)    MICHELLE Oden - CNP (electronically signed)

## 2024-05-11 NOTE — DISCHARGE INSTRUCTIONS
Please go directly to [facility] ER and check in through the ER desk, let them know that you have an outpatient order for an ultrasound.  You will have your ultrasound completed and then wait until I have the results and I will speak to you by phone.  If symptoms worsen significantly please check into the ER.

## 2024-05-15 ENCOUNTER — TELEPHONE (OUTPATIENT)
Dept: ENDOCRINOLOGY | Age: 34
End: 2024-05-15

## 2024-05-15 NOTE — TELEPHONE ENCOUNTER
Pt called to schedule her appt with Endocrinology.  She is currently pregnant.  Can you please advise on scheduling?

## 2024-05-30 ENCOUNTER — OFFICE VISIT (OUTPATIENT)
Dept: ENDOCRINOLOGY | Age: 34
End: 2024-05-30
Payer: COMMERCIAL

## 2024-05-30 VITALS
SYSTOLIC BLOOD PRESSURE: 125 MMHG | HEIGHT: 68 IN | OXYGEN SATURATION: 99 % | BODY MASS INDEX: 21.82 KG/M2 | WEIGHT: 144 LBS | DIASTOLIC BLOOD PRESSURE: 85 MMHG | HEART RATE: 94 BPM | RESPIRATION RATE: 18 BRPM

## 2024-05-30 DIAGNOSIS — R94.6 ABNORMAL THYROID FUNCTION TEST: Primary | ICD-10-CM

## 2024-05-30 PROCEDURE — 99204 OFFICE O/P NEW MOD 45 MIN: CPT | Performed by: INTERNAL MEDICINE

## 2024-05-30 NOTE — PROGRESS NOTES
[place] [Macadamia Nut Oil] Anaphylaxis       CURRENT MEDICATIONS   Current Outpatient Medications   Medication Sig Dispense Refill    Prenatal MV-Min-Fe Fum-FA-DHA (PRENATAL 1 PO) Take by mouth       No current facility-administered medications for this visit.       Review of Systems  Constitutional: No fever, no chills, no diaphoresis, no generalized weakness.  HEENT: No blurred vision, No sore throat, no ear pain, no hair loss  Neck: denied any neck swelling, difficulty swallowing,   Cadrdiopulomary: No CP, SOB or palpitation, No orthopnea or PND. No cough or wheezing.  GI: No N/V/D, no constipation, No abdominal pain, no melena or hematochezia   : Denied any dysuria, hematuria, flank pain, discharge, or incontinence.   Skin: denied any rash, ulcer, Hirsute, or hyperpigmentation.   MSK: denied any joint deformity, joint pain/swelling, muscle pain, or back pain.  Neuro: no numbess, no tingling, no weakness,     OBJECTIVE    /85   Pulse 94   Resp 18   Ht 1.727 m (5' 8\")   Wt 65.3 kg (144 lb)   LMP 10/02/2023 (Exact Date)   SpO2 99%   BMI 21.90 kg/m²   BP Readings from Last 4 Encounters:   05/30/24 125/85   05/15/24 103/68   05/10/24 112/69   05/01/24 118/62     Wt Readings from Last 6 Encounters:   05/30/24 65.3 kg (144 lb)   05/15/24 64.4 kg (142 lb)   05/10/24 63.5 kg (140 lb)   05/01/24 64.9 kg (143 lb)   04/17/24 65.3 kg (144 lb)   03/20/24 60.8 kg (134 lb)       Physical examination:  General: awake alert, oriented x3, no abnormal position or movements.   HEENT: normocephalic non traumatic, no exophthalmos, no lid lag, no lid retraction   Neck: supple, no LN enlargement, no thyromegaly, no thyroid tenderness, no thyroid bruit, no JVD.  Pulm: Clear equal air entry no added sounds, no wheezing or rhonchi    CVS: S1 + S2, no murmur, no heave. Dorsalis pedis pulse palpable   Abd: soft lax, no tenderness, no organomegaly, audible bowel sounds.  Skin: warm, no lesions, no rash. No palmar erythema, no

## 2024-06-28 ENCOUNTER — HOSPITAL ENCOUNTER (INPATIENT)
Age: 34
LOS: 2 days | Discharge: HOME | End: 2024-06-30
Attending: OBSTETRICS & GYNECOLOGY | Admitting: OBSTETRICS & GYNECOLOGY
Payer: COMMERCIAL

## 2024-06-28 ENCOUNTER — ANESTHESIA (OUTPATIENT)
Dept: LABOR AND DELIVERY | Age: 34
End: 2024-06-28
Payer: COMMERCIAL

## 2024-06-28 ENCOUNTER — ANESTHESIA EVENT (OUTPATIENT)
Dept: LABOR AND DELIVERY | Age: 34
End: 2024-06-28
Payer: COMMERCIAL

## 2024-06-28 PROBLEM — Z3A.38 38 WEEKS GESTATION OF PREGNANCY: Status: ACTIVE | Noted: 2024-06-28

## 2024-06-28 LAB
ABO + RH BLD: NORMAL
AMPHET UR QL SCN: NEGATIVE
ARM BAND NUMBER: NORMAL
BARBITURATES UR QL SCN: NEGATIVE
BENZODIAZ UR QL: NEGATIVE
BLOOD BANK SAMPLE EXPIRATION: NORMAL
BLOOD GROUP ANTIBODIES SERPL: NEGATIVE
BUPRENORPHINE UR QL: NEGATIVE
CANNABINOIDS UR QL SCN: NEGATIVE
COCAINE UR QL SCN: NEGATIVE
ERYTHROCYTE [DISTWIDTH] IN BLOOD BY AUTOMATED COUNT: 13.2 % (ref 11.5–15)
FENTANYL UR QL: NEGATIVE
HCT VFR BLD AUTO: 30.8 % (ref 34–48)
HGB BLD-MCNC: 9.6 G/DL (ref 11.5–15.5)
MCH RBC QN AUTO: 26.7 PG (ref 26–35)
MCHC RBC AUTO-ENTMCNC: 31.2 G/DL (ref 32–34.5)
MCV RBC AUTO: 85.8 FL (ref 80–99.9)
METHADONE UR QL: NEGATIVE
OPIATES UR QL SCN: NEGATIVE
OXYCODONE UR QL SCN: NEGATIVE
PCP UR QL SCN: NEGATIVE
PLATELET # BLD AUTO: 453 K/UL (ref 130–450)
PMV BLD AUTO: 9.1 FL (ref 7–12)
RBC # BLD AUTO: 3.59 M/UL (ref 3.5–5.5)
TEST INFORMATION: NORMAL
WBC OTHER # BLD: 14.9 K/UL (ref 4.5–11.5)

## 2024-06-28 PROCEDURE — 80307 DRUG TEST PRSMV CHEM ANLYZR: CPT

## 2024-06-28 PROCEDURE — 86850 RBC ANTIBODY SCREEN: CPT

## 2024-06-28 PROCEDURE — 99221 1ST HOSP IP/OBS SF/LOW 40: CPT

## 2024-06-28 PROCEDURE — 86901 BLOOD TYPING SEROLOGIC RH(D): CPT

## 2024-06-28 PROCEDURE — 86900 BLOOD TYPING SEROLOGIC ABO: CPT

## 2024-06-28 PROCEDURE — 1220000001 HC SEMI PRIVATE L&D R&B

## 2024-06-28 PROCEDURE — 2580000003 HC RX 258: Performed by: OBSTETRICS & GYNECOLOGY

## 2024-06-28 PROCEDURE — 85027 COMPLETE CBC AUTOMATED: CPT

## 2024-06-28 RX ORDER — ACETAMINOPHEN 325 MG/1
650 TABLET ORAL EVERY 4 HOURS PRN
Status: DISCONTINUED | OUTPATIENT
Start: 2024-06-28 | End: 2024-06-29

## 2024-06-28 RX ORDER — SODIUM CHLORIDE 0.9 % (FLUSH) 0.9 %
5-40 SYRINGE (ML) INJECTION PRN
Status: DISCONTINUED | OUTPATIENT
Start: 2024-06-28 | End: 2024-06-29

## 2024-06-28 RX ORDER — ONDANSETRON 2 MG/ML
4 INJECTION INTRAMUSCULAR; INTRAVENOUS EVERY 6 HOURS PRN
Status: DISCONTINUED | OUTPATIENT
Start: 2024-06-28 | End: 2024-06-29

## 2024-06-28 RX ORDER — ACETAMINOPHEN 650 MG
TABLET, EXTENDED RELEASE ORAL
Status: DISCONTINUED
Start: 2024-06-28 | End: 2024-06-29

## 2024-06-28 RX ORDER — SODIUM CHLORIDE 0.9 % (FLUSH) 0.9 %
5-40 SYRINGE (ML) INJECTION EVERY 12 HOURS SCHEDULED
Status: DISCONTINUED | OUTPATIENT
Start: 2024-06-28 | End: 2024-06-29

## 2024-06-28 RX ORDER — METHYLERGONOVINE MALEATE 0.2 MG/ML
200 INJECTION INTRAVENOUS PRN
Status: DISCONTINUED | OUTPATIENT
Start: 2024-06-28 | End: 2024-06-29

## 2024-06-28 RX ORDER — SODIUM CHLORIDE, SODIUM LACTATE, POTASSIUM CHLORIDE, AND CALCIUM CHLORIDE .6; .31; .03; .02 G/100ML; G/100ML; G/100ML; G/100ML
500 INJECTION, SOLUTION INTRAVENOUS PRN
Status: DISCONTINUED | OUTPATIENT
Start: 2024-06-28 | End: 2024-06-29

## 2024-06-28 RX ORDER — SODIUM CHLORIDE 9 MG/ML
25 INJECTION, SOLUTION INTRAVENOUS PRN
Status: DISCONTINUED | OUTPATIENT
Start: 2024-06-28 | End: 2024-06-29

## 2024-06-28 RX ORDER — TERBUTALINE SULFATE 1 MG/ML
0.25 INJECTION, SOLUTION SUBCUTANEOUS
Status: DISCONTINUED | OUTPATIENT
Start: 2024-06-28 | End: 2024-06-29

## 2024-06-28 RX ORDER — TRANEXAMIC ACID 10 MG/ML
1000 INJECTION, SOLUTION INTRAVENOUS
Status: DISCONTINUED | OUTPATIENT
Start: 2024-06-28 | End: 2024-06-29

## 2024-06-28 RX ORDER — CARBOPROST TROMETHAMINE 250 UG/ML
250 INJECTION, SOLUTION INTRAMUSCULAR PRN
Status: DISCONTINUED | OUTPATIENT
Start: 2024-06-28 | End: 2024-06-29

## 2024-06-28 RX ORDER — ONDANSETRON 4 MG/1
4 TABLET, ORALLY DISINTEGRATING ORAL EVERY 6 HOURS PRN
Status: DISCONTINUED | OUTPATIENT
Start: 2024-06-28 | End: 2024-06-29

## 2024-06-28 RX ORDER — SODIUM CHLORIDE, SODIUM LACTATE, POTASSIUM CHLORIDE, CALCIUM CHLORIDE 600; 310; 30; 20 MG/100ML; MG/100ML; MG/100ML; MG/100ML
INJECTION, SOLUTION INTRAVENOUS CONTINUOUS
Status: DISCONTINUED | OUTPATIENT
Start: 2024-06-28 | End: 2024-06-29

## 2024-06-28 RX ORDER — MISOPROSTOL 200 UG/1
400 TABLET ORAL PRN
Status: DISCONTINUED | OUTPATIENT
Start: 2024-06-28 | End: 2024-06-29

## 2024-06-28 RX ORDER — LIDOCAINE HYDROCHLORIDE 10 MG/ML
INJECTION, SOLUTION INFILTRATION; PERINEURAL
Status: DISCONTINUED
Start: 2024-06-28 | End: 2024-06-29

## 2024-06-28 RX ADMIN — SODIUM CHLORIDE, POTASSIUM CHLORIDE, SODIUM LACTATE AND CALCIUM CHLORIDE: 600; 310; 30; 20 INJECTION, SOLUTION INTRAVENOUS at 19:14

## 2024-06-28 ASSESSMENT — LIFESTYLE VARIABLES: SMOKING_STATUS: 0

## 2024-06-28 NOTE — PROGRESS NOTES
Pt , 38w4d presents for contractions that started at 1000 this morning. Pt states she was 5cm at the office today. Denies VB or LOF. Pt reports fetal movement.

## 2024-06-28 NOTE — PROGRESS NOTES
Dr Miller called in to unit for patient update  Updated that patient has been moved down to labor and delivery     Orders for house officer to AROM and continue to update as needed

## 2024-06-28 NOTE — ANESTHESIA PRE PROCEDURE
patient.      Plan discussed with CRNA and attending.                    LUTHER RAMIREZ RN   6/28/2024

## 2024-06-28 NOTE — H&P
Department of Obstetrics and Gynecology  Labor and Delivery  History & Physical    Patient:  Patrice Charles     Admit Date:  2024  4:10 PM  Medical Record Number:  [id number]    CHIEF COMPLAINT:  contractions    PROBLEM LIST:     Patient Active Problem List   Diagnosis   (none) - all problems resolved or deleted           HISTORY OF PRESENT ILLNESS:    The patient is a 33 y.o.  female  at 38w4d.  Patient presents with a chief complaint as above and is being admitted for sontractions since 1000, about q 15 minutes. Was 5 cm in office but was not ready to be admitted at that time due to children arrangement. +FM. Denies any VB or LOF. Patient agreeable to admission at this time.    ESTIMATED DUE DATE: Estimated Date of Delivery: 24    PRENATAL CARE:  Complicated by: patient denies  GBS: negative    Past OB History  OB History          6    Para   2    Term   2            AB   2    Living   2         SAB   2    IAB        Ectopic        Molar        Multiple   0    Live Births   2                Past Medical History:        Diagnosis Date    Thyroid fullness        Past Surgical History:    History reviewed. No pertinent surgical history.    Allergies:  Strawberry and [place] [macadamia nut oil]    Social History:    Social History     Socioeconomic History    Marital status:      Spouse name: Not on file    Number of children: Not on file    Years of education: Not on file    Highest education level: Not on file   Occupational History    Not on file   Tobacco Use    Smoking status: Never    Smokeless tobacco: Never   Vaping Use    Vaping Use: Never used   Substance and Sexual Activity    Alcohol use: Never    Drug use: Not on file    Sexual activity: Not on file   Other Topics Concern    Not on file   Social History Narrative    Not on file     Social Determinants of Health     Financial Resource Strain: Not on file   Food Insecurity: Not on file   Transportation Needs: Not on

## 2024-06-29 PROCEDURE — 6370000000 HC RX 637 (ALT 250 FOR IP)

## 2024-06-29 PROCEDURE — 6370000000 HC RX 637 (ALT 250 FOR IP): Performed by: OBSTETRICS & GYNECOLOGY

## 2024-06-29 PROCEDURE — 6360000002 HC RX W HCPCS: Performed by: OBSTETRICS & GYNECOLOGY

## 2024-06-29 PROCEDURE — 7200000001 HC VAGINAL DELIVERY

## 2024-06-29 PROCEDURE — 0KQM0ZZ REPAIR PERINEUM MUSCLE, OPEN APPROACH: ICD-10-PCS | Performed by: OBSTETRICS & GYNECOLOGY

## 2024-06-29 PROCEDURE — APPNB30 APP NON BILLABLE TIME 0-30 MINS: Performed by: ADVANCED PRACTICE MIDWIFE

## 2024-06-29 PROCEDURE — 1220000000 HC SEMI PRIVATE OB R&B

## 2024-06-29 PROCEDURE — 10907ZC DRAINAGE OF AMNIOTIC FLUID, THERAPEUTIC FROM PRODUCTS OF CONCEPTION, VIA NATURAL OR ARTIFICIAL OPENING: ICD-10-PCS | Performed by: OBSTETRICS & GYNECOLOGY

## 2024-06-29 PROCEDURE — 2580000003 HC RX 258: Performed by: OBSTETRICS & GYNECOLOGY

## 2024-06-29 RX ORDER — SODIUM CHLORIDE 9 MG/ML
INJECTION, SOLUTION INTRAVENOUS PRN
Status: DISCONTINUED | OUTPATIENT
Start: 2024-06-29 | End: 2024-06-30 | Stop reason: HOSPADM

## 2024-06-29 RX ORDER — ACETAMINOPHEN 500 MG
1000 TABLET ORAL EVERY 8 HOURS SCHEDULED
Status: DISCONTINUED | OUTPATIENT
Start: 2024-06-29 | End: 2024-06-29

## 2024-06-29 RX ORDER — ACETAMINOPHEN 500 MG
TABLET ORAL
Status: DISCONTINUED
Start: 2024-06-29 | End: 2024-06-29 | Stop reason: WASHOUT

## 2024-06-29 RX ORDER — ONDANSETRON 4 MG/1
4 TABLET, ORALLY DISINTEGRATING ORAL EVERY 6 HOURS PRN
Status: DISCONTINUED | OUTPATIENT
Start: 2024-06-29 | End: 2024-06-30 | Stop reason: HOSPADM

## 2024-06-29 RX ORDER — ACETAMINOPHEN 500 MG
1000 TABLET ORAL EVERY 8 HOURS SCHEDULED
Status: DISCONTINUED | OUTPATIENT
Start: 2024-06-29 | End: 2024-06-30 | Stop reason: HOSPADM

## 2024-06-29 RX ORDER — IBUPROFEN 800 MG/1
800 TABLET ORAL EVERY 8 HOURS PRN
Qty: 30 TABLET | Refills: 0 | Status: SHIPPED | OUTPATIENT
Start: 2024-06-29

## 2024-06-29 RX ORDER — FERROUS SULFATE 325(65) MG
325 TABLET ORAL EVERY OTHER DAY
Status: DISCONTINUED | OUTPATIENT
Start: 2024-06-29 | End: 2024-06-30 | Stop reason: HOSPADM

## 2024-06-29 RX ORDER — HYDROCODONE BITARTRATE AND ACETAMINOPHEN 5; 325 MG/1; MG/1
1 TABLET ORAL EVERY 6 HOURS PRN
Status: DISCONTINUED | OUTPATIENT
Start: 2024-06-29 | End: 2024-06-30 | Stop reason: HOSPADM

## 2024-06-29 RX ORDER — IBUPROFEN 800 MG/1
800 TABLET ORAL EVERY 8 HOURS SCHEDULED
Status: DISCONTINUED | OUTPATIENT
Start: 2024-06-29 | End: 2024-06-30 | Stop reason: HOSPADM

## 2024-06-29 RX ORDER — DOCUSATE SODIUM 100 MG/1
100 CAPSULE, LIQUID FILLED ORAL 2 TIMES DAILY
Status: DISCONTINUED | OUTPATIENT
Start: 2024-06-29 | End: 2024-06-30 | Stop reason: HOSPADM

## 2024-06-29 RX ORDER — ONDANSETRON 2 MG/ML
4 INJECTION INTRAMUSCULAR; INTRAVENOUS EVERY 6 HOURS PRN
Status: DISCONTINUED | OUTPATIENT
Start: 2024-06-29 | End: 2024-06-30 | Stop reason: HOSPADM

## 2024-06-29 RX ORDER — SODIUM CHLORIDE 0.9 % (FLUSH) 0.9 %
5-40 SYRINGE (ML) INJECTION EVERY 12 HOURS SCHEDULED
Status: DISCONTINUED | OUTPATIENT
Start: 2024-06-29 | End: 2024-06-30 | Stop reason: HOSPADM

## 2024-06-29 RX ORDER — MODIFIED LANOLIN
OINTMENT (GRAM) TOPICAL PRN
Status: DISCONTINUED | OUTPATIENT
Start: 2024-06-29 | End: 2024-06-30 | Stop reason: HOSPADM

## 2024-06-29 RX ORDER — SODIUM CHLORIDE 0.9 % (FLUSH) 0.9 %
5-40 SYRINGE (ML) INJECTION PRN
Status: DISCONTINUED | OUTPATIENT
Start: 2024-06-29 | End: 2024-06-30 | Stop reason: HOSPADM

## 2024-06-29 RX ADMIN — ACETAMINOPHEN 1000 MG: 500 TABLET ORAL at 05:56

## 2024-06-29 RX ADMIN — Medication 166.7 ML: at 03:40

## 2024-06-29 RX ADMIN — SODIUM CHLORIDE, PRESERVATIVE FREE 10 ML: 5 INJECTION INTRAVENOUS at 08:53

## 2024-06-29 RX ADMIN — DOCUSATE SODIUM 100 MG: 100 CAPSULE, LIQUID FILLED ORAL at 20:04

## 2024-06-29 RX ADMIN — DOCUSATE SODIUM 100 MG: 100 CAPSULE, LIQUID FILLED ORAL at 08:53

## 2024-06-29 RX ADMIN — IBUPROFEN 800 MG: 800 TABLET, FILM COATED ORAL at 17:18

## 2024-06-29 RX ADMIN — IBUPROFEN 800 MG: 800 TABLET, FILM COATED ORAL at 09:04

## 2024-06-29 RX ADMIN — ACETAMINOPHEN 1000 MG: 500 TABLET ORAL at 13:10

## 2024-06-29 RX ADMIN — Medication 87.3 MILLI-UNITS/MIN: at 03:51

## 2024-06-29 RX ADMIN — FERROUS SULFATE TAB 325 MG (65 MG ELEMENTAL FE) 325 MG: 325 (65 FE) TAB at 08:53

## 2024-06-29 ASSESSMENT — PAIN DESCRIPTION - FREQUENCY: FREQUENCY: INTERMITTENT

## 2024-06-29 ASSESSMENT — PAIN SCALES - GENERAL
PAINLEVEL_OUTOF10: 4
PAINLEVEL_OUTOF10: 5
PAINLEVEL_OUTOF10: 2

## 2024-06-29 ASSESSMENT — PAIN DESCRIPTION - DESCRIPTORS: DESCRIPTORS: ACHING;CRAMPING;DISCOMFORT

## 2024-06-29 ASSESSMENT — PAIN DESCRIPTION - LOCATION: LOCATION: ABDOMEN;VAGINA

## 2024-06-29 ASSESSMENT — PAIN DESCRIPTION - PAIN TYPE: TYPE: ACUTE PAIN

## 2024-06-29 NOTE — PROGRESS NOTES
Updated Dr. Miller on failure to AROM. Orders received for patient to empty bladder and start pitocin.

## 2024-06-29 NOTE — PROGRESS NOTES
Spoke with Dr. Swift to come to bedside for AROM. Stated that he did not feel comfortable breaking water until pitocin was started and contraction pattern was more consistent. Physician notified.

## 2024-06-29 NOTE — PROGRESS NOTES
Called to stand by for delivery until Dr Miller gets here. Patient with good breathing control and urge to push

## 2024-06-29 NOTE — L&D DELIVERY SUMMARY NOTE
Vaginal Delivery Note    Details of Procedure:   The patient is a 33 y.o. female at 38w5d   OB History          6    Para   2    Term   2            AB   2    Living   2         SAB   2    IAB        Ectopic        Molar        Multiple   0    Live Births   2             who was admitted for active phase labor. She received the following interventions: ARBOW She was known to be GBS negative and did not receive antibiotic prophylaxis. The patient progressed well,did receive an epidural, became complete and started to push. After pushing for 10 mins the fetal head was at the perineum, nose and mouth suctioned with bulb suction and the rest of the infant delivered atraumatically, placed on mother abdomen.Cord was clamped and cut and infant handed off to the waiting nurse for evaluation. The delivery of the placenta was spontaneous. The perineum and vagina were explored and a second degree laceration was repaired in standard fashion.    male infant delivered at 0334, Apgars of 8 and 9.     Anesthesia:  none    Estimated blood loss:  300ml    Specimen:  Placenta not sent to pathology     Cord blood sent Yes    Complications:  none    Condition:  infant stable to general nursery    Rina Miller MD MLUIS. FACOG

## 2024-06-29 NOTE — PROGRESS NOTES
Updated Dr. Miller on SVE, contraction pattern and EFM. Orders received to call physician when ready for delivery.

## 2024-06-29 NOTE — LACTATION NOTE
Experienced mom reports baby has been nursing well, spit up mucus at this time. Encouraged skin to skin and frequent attempts at breast to stimulate milk production. Instructed on normal infant behavior in the first 12-24 hours and importance of stimulating the baby frequently to eat during this time. Reviewed hand expression, and encouraged to hand express drops of colostrum when baby is sleepy. Instructed that baby may also feed 8-12 times a day- cluster feeding at times- as her milk supply is being established.  Instructed on benefits of skin to skin and avoidance of pacifier / artificial nipple use until breastfeeding is well established.  Educated on making sure infant has an open airway while breastfeeding and skin to skin. Instructed on hunger cues and waking techniques to try. Reviewed signs of adequate I & O; allow baby to feed ad joy and not to limit time at breast. Breastfeeding booklet provided with review of its contents. Encouraged to call with any concerns. Mom has a breast pump for home use.

## 2024-06-29 NOTE — PROGRESS NOTES
Postpartum Day 0: Vaginal Delivery    The patient feels well.  Pain is well controlled with current medications. Baby is feeding via breast.     Objective:        Vitals:    06/29/24 0856   BP: 119/76   Pulse: 81   Resp: 16   Temp: 98.2 °F (36.8 °C)   SpO2: 100%         Lab Results   Component Value Date    WBC 14.9 (H) 06/28/2024    HGB 9.6 (L) 06/28/2024    HCT 30.8 (L) 06/28/2024    MCV 85.8 06/28/2024     (H) 06/28/2024       General:    alert, appears stated age, and cooperative   Lochia:  appropriate   Uterine    firm   DVT Evaluation:  No evidence of DVT seen on physical exam.     Assessment:     Status post Vaginal Delivery.good    Plan:     Continue current care.

## 2024-06-29 NOTE — DISCHARGE INSTRUCTIONS
Follow-up with your OB doctor in  6 weeks for vaginal delivery unless otherwise instructed.   Call office for an appointment.    For breastfeeding support, you can contact our lactation specialists at [phone] or [phone]    DIET  Eat a well balanced diet focusing on foods high in fiber and protein  Drink plenty of fluids especially water.  To avoid constipation you may take a mild stool softener as recommended by your doctor or midwife.    ACTIVITY  Gradually increase your activity.  Resume exercise regimen only after advised by your doctor or midwife.  Avoid lifting anything heavier than your baby or a gallon of milk for SIX weeks.   Avoid driving until your doctor or midwife has given their approval.  Rise slowly from a lying to sitting and then a standing position.  Climb stairs one at a time.  Use caution when carrying your baby up and down the stairs.  No sexual activity for 6 weeks or until advised by your doctor - Nothing in vagina: intercourse, tampons, or douching.   Be prepared to discuss family planning at your follow-up OB visit.   You may feel tired or have a lack of energy.  You may continue your prenatal vitamin to replenish nutrients post delivery.  Nap when baby naps to catch up on sleep.  May return to work or school in 6 weeks or as directed by OB.     EMOTIONS  You may feed otoole, sad, teary, & overwhelmed.  Contact your OB provider if you feel you may be showing signs of postpartum depression, or have thoughts of harming yourself or your infant.  If infant will not stop crying, contact another adult for help or place infant in their crib on their back and take a break.  NEVER shake your infant.      BLEEDING  Vaginal bleeding will decrease in amount over the next few weeks.  You will notice that as your activity increases, your flow may increase.  This is your body's way of telling you, you need to take things easier and rest more often.  Call your OB/ER if you are saturating more than

## 2024-06-29 NOTE — PROGRESS NOTES
Patient states she does not want any pitocin. Educated patient on the benefits of starting pitocin. Physician notified.

## 2024-06-30 VITALS
SYSTOLIC BLOOD PRESSURE: 120 MMHG | RESPIRATION RATE: 16 BRPM | BODY MASS INDEX: 22.43 KG/M2 | DIASTOLIC BLOOD PRESSURE: 80 MMHG | HEIGHT: 68 IN | TEMPERATURE: 98.6 F | HEART RATE: 88 BPM | WEIGHT: 148 LBS | OXYGEN SATURATION: 97 %

## 2024-06-30 LAB
HCT VFR BLD AUTO: 26.7 % (ref 34–48)
HGB BLD-MCNC: 8.4 G/DL (ref 11.5–15.5)

## 2024-06-30 PROCEDURE — 6370000000 HC RX 637 (ALT 250 FOR IP): Performed by: OBSTETRICS & GYNECOLOGY

## 2024-06-30 PROCEDURE — 85018 HEMOGLOBIN: CPT

## 2024-06-30 PROCEDURE — 85014 HEMATOCRIT: CPT

## 2024-06-30 RX ADMIN — Medication: at 10:43

## 2024-06-30 RX ADMIN — FERROUS SULFATE TAB 325 MG (65 MG ELEMENTAL FE) 325 MG: 325 (65 FE) TAB at 10:42

## 2024-06-30 RX ADMIN — DOCUSATE SODIUM 100 MG: 100 CAPSULE, LIQUID FILLED ORAL at 10:43

## 2024-06-30 RX ADMIN — IBUPROFEN 800 MG: 800 TABLET, FILM COATED ORAL at 10:42

## 2024-06-30 RX ADMIN — ACETAMINOPHEN 1000 MG: 500 TABLET ORAL at 03:31

## 2024-06-30 ASSESSMENT — PAIN - FUNCTIONAL ASSESSMENT
PAIN_FUNCTIONAL_ASSESSMENT: ACTIVITIES ARE NOT PREVENTED
PAIN_FUNCTIONAL_ASSESSMENT: ACTIVITIES ARE NOT PREVENTED

## 2024-06-30 ASSESSMENT — PAIN DESCRIPTION - ORIENTATION
ORIENTATION: LOWER
ORIENTATION: LOWER

## 2024-06-30 ASSESSMENT — PAIN DESCRIPTION - LOCATION
LOCATION: PERINEUM
LOCATION: ABDOMEN

## 2024-06-30 ASSESSMENT — PAIN DESCRIPTION - DESCRIPTORS
DESCRIPTORS: CRAMPING
DESCRIPTORS: SORE;TENDER

## 2024-06-30 ASSESSMENT — PAIN SCALES - GENERAL
PAINLEVEL_OUTOF10: 4
PAINLEVEL_OUTOF10: 6

## 2024-06-30 NOTE — PROGRESS NOTES
[employer] [place] Hearing screening results were discussed with parent. Questions answered. Brochure given to parent. Advised to monitor developmental milestones and contact physician for any concerns.       Kvng Chino CCC/KYLE  Audiologist  [id number]  NPI#:  [id number]

## 2024-06-30 NOTE — PROGRESS NOTES
Postpartum Day 1: Vaginal Delivery    The patient feels well.  Pain is well controlled with current medications. Baby is feeding via breast.     Objective:        Vitals:    06/30/24 0700   BP: 120/80   Pulse: 88   Resp: 16   Temp: 98.6 °F (37 °C)   SpO2: 97%         Lab Results   Component Value Date    WBC 14.9 (H) 06/28/2024    HGB 8.4 (L) 06/30/2024    HCT 26.7 (L) 06/30/2024    MCV 85.8 06/28/2024     (H) 06/28/2024       General:    alert, appears stated age, and cooperative   Lochia:  appropriate   Uterine    firm   DVT Evaluation:  No evidence of DVT seen on physical exam.     Assessment:     Status post Vaginal Delivery.good  Anemia stable on PNV    Plan:     Continue current care.

## 2024-06-30 NOTE — LACTATION NOTE
Mom reports baby is nursing well, no concerns. Encouraged frequent feeds to establish milk supply. Reviewed benefits and safety of skin to skin. Inst on adequate I/O and importance of keeping track of diapers at home. Instructed on signs of dehydration such as infant refusing to feed, decreased wet diapers and infant becoming listless and notify provider if these occur. Reviewed with mom the importance of notifying the physician if baby looks more jaundiced. Lactation office # given if follow-up needed, as well as support group information. Encouraged to call with any concerns. Support and encouragement given.

## 2024-08-05 ENCOUNTER — HOSPITAL ENCOUNTER (OUTPATIENT)
Age: 34
Setting detail: OBSERVATION
Discharge: HOME | End: 2024-08-05
Attending: OBSTETRICS & GYNECOLOGY | Admitting: OBSTETRICS & GYNECOLOGY
Payer: COMMERCIAL

## 2024-08-05 ENCOUNTER — APPOINTMENT (OUTPATIENT)
Dept: ULTRASOUND IMAGING | Age: 34
End: 2024-08-05

## 2024-08-05 VITALS
SYSTOLIC BLOOD PRESSURE: 109 MMHG | HEART RATE: 65 BPM | DIASTOLIC BLOOD PRESSURE: 70 MMHG | RESPIRATION RATE: 18 BRPM | OXYGEN SATURATION: 97 % | TEMPERATURE: 98 F

## 2024-08-05 LAB
ABO + RH BLD: NORMAL
ARM BAND NUMBER: NORMAL
BASOPHILS # BLD: 0.06 K/UL (ref 0–0.2)
BASOPHILS NFR BLD: 1 % (ref 0–2)
BLOOD BANK SAMPLE EXPIRATION: NORMAL
BLOOD GROUP ANTIBODIES SERPL: NEGATIVE
D-DIMER QUANTITATIVE: 496 NG/ML DDU (ref 0–230)
EOSINOPHIL # BLD: 0.16 K/UL (ref 0.05–0.5)
EOSINOPHILS RELATIVE PERCENT: 2 % (ref 0–6)
ERYTHROCYTE [DISTWIDTH] IN BLOOD BY AUTOMATED COUNT: 14.8 % (ref 11.5–15)
FIBRINOGEN PPP-MCNC: 240 MG/DL (ref 200–400)
HCT VFR BLD AUTO: 37.1 % (ref 34–48)
HGB BLD-MCNC: 12 G/DL (ref 11.5–15.5)
IMM GRANULOCYTES # BLD AUTO: <0.03 K/UL (ref 0–0.58)
IMM GRANULOCYTES NFR BLD: 0 % (ref 0–5)
INR PPP: 1
LYMPHOCYTES NFR BLD: 3.71 K/UL (ref 1.5–4)
LYMPHOCYTES RELATIVE PERCENT: 39 % (ref 20–42)
MCH RBC QN AUTO: 27.5 PG (ref 26–35)
MCHC RBC AUTO-ENTMCNC: 32.3 G/DL (ref 32–34.5)
MCV RBC AUTO: 84.9 FL (ref 80–99.9)
MONOCYTES NFR BLD: 0.7 K/UL (ref 0.1–0.95)
MONOCYTES NFR BLD: 7 % (ref 2–12)
NEUTROPHILS NFR BLD: 51 % (ref 43–80)
NEUTS SEG NFR BLD: 4.88 K/UL (ref 1.8–7.3)
PARTIAL THROMBOPLASTIN TIME: 36.7 SEC (ref 24.5–35.1)
PLATELET # BLD AUTO: 318 K/UL (ref 130–450)
PMV BLD AUTO: 9.2 FL (ref 7–12)
PROTHROMBIN TIME: 10 SEC (ref 9.3–12.4)
RBC # BLD AUTO: 4.37 M/UL (ref 3.5–5.5)
WBC OTHER # BLD: 9.5 K/UL (ref 4.5–11.5)

## 2024-08-05 PROCEDURE — G0378 HOSPITAL OBSERVATION PER HR: HCPCS

## 2024-08-05 PROCEDURE — 99212 OFFICE O/P EST SF 10 MIN: CPT | Performed by: STUDENT IN AN ORGANIZED HEALTH CARE EDUCATION/TRAINING PROGRAM

## 2024-08-05 PROCEDURE — 86850 RBC ANTIBODY SCREEN: CPT

## 2024-08-05 PROCEDURE — 85025 COMPLETE CBC W/AUTO DIFF WBC: CPT

## 2024-08-05 PROCEDURE — 86900 BLOOD TYPING SEROLOGIC ABO: CPT

## 2024-08-05 PROCEDURE — 76830 TRANSVAGINAL US NON-OB: CPT

## 2024-08-05 PROCEDURE — 86901 BLOOD TYPING SEROLOGIC RH(D): CPT

## 2024-08-05 PROCEDURE — 85379 FIBRIN DEGRADATION QUANT: CPT

## 2024-08-05 PROCEDURE — 85384 FIBRINOGEN ACTIVITY: CPT

## 2024-08-05 PROCEDURE — 85730 THROMBOPLASTIN TIME PARTIAL: CPT

## 2024-08-05 PROCEDURE — 85610 PROTHROMBIN TIME: CPT

## 2024-08-05 RX ORDER — ACETAMINOPHEN 500 MG
1000 TABLET ORAL EVERY 8 HOURS SCHEDULED
Status: DISCONTINUED | OUTPATIENT
Start: 2024-08-05 | End: 2024-08-06 | Stop reason: HOSPADM

## 2024-08-05 RX ORDER — ONDANSETRON 4 MG/1
4 TABLET, ORALLY DISINTEGRATING ORAL EVERY 8 HOURS PRN
Status: DISCONTINUED | OUTPATIENT
Start: 2024-08-05 | End: 2024-08-06 | Stop reason: HOSPADM

## 2024-08-05 RX ORDER — ONDANSETRON 2 MG/ML
4 INJECTION INTRAMUSCULAR; INTRAVENOUS EVERY 6 HOURS PRN
Status: DISCONTINUED | OUTPATIENT
Start: 2024-08-05 | End: 2024-08-06 | Stop reason: HOSPADM

## 2024-08-05 NOTE — PROGRESS NOTES
Patient is a , post partum vaginal delivery from 24. Patient states for the last 2 weeks she has noticed heavier, bright red bleeding and then today was gushing and going through 1 pad and the rest into a toilet. Patient denies any complications with her pregnancy or delivery. Vitals stable at this time.   No significant past surgical history

## 2024-08-05 NOTE — H&P
Department of Obstetrics and Gynecology  Labor and Delivery  Attending History and Pysical       Subjective:   Patrice Charles is a 33 y.o. female   here with postpartum bleeding for a patient who delivered on 24. She had an uncomplicated . Reports that approx two weeks ago noted bright red bleeding which worsened in the last 3 days.     Reports no dizziness, palpitations, SOB, CP.   No hx of bleeding disorders    OB History    Para Term  AB Living   6 3 3   2 3   SAB IAB Ectopic Molar Multiple Live Births   2       0 3      # Outcome Date GA Lbr Brady/2nd Weight Sex Delivery Anes PTL Lv   6 Term 24 38w5d  3.28 kg (7 lb 3.7 oz) M Vag-Spont None N VERÓNICA   5 Term 21 39w0d / 00:09 3.629 kg (8 lb) M Vag-Spont None N VERÓNICA   4 SAB 2020           3 2019           2 Term 18 40w2d  4.03 kg (8 lb 14.2 oz) M OTHER   VERÓNICA   1                 Review of Systems  Skin: no changes  All other review of systems negative    Past Medical History  Past Medical History:   Diagnosis Date    Thyroid fullness        Past Surgical History  No past surgical history on file.    Allergies  Allergies   Allergen Reactions    Strawberry Anaphylaxis    [place] [Macadamia Nut Oil] Anaphylaxis       Family History: Non contributory    Social History: Denies smoking, alcohol, drugs    Physical Exam:    CONSTITUTIONAL:  awake, alert, cooperative, no apparent distress  LUNGS:  No increased work of breathing  ABDOMEN:  no rebound or guarding . gravid  EXTREMETIES:  Minimal edema.  PELVIC: Normal external genitalia. Vaginal canal  with clots in the posterior fornix. No active bleeding noted     BSUS: on quick BSUS thin lining      Assessment/Plan:      Patrice Charles is a 33 y.o.  s/p  1 month out with bleeding. Asymptomatic at present with reassuring vital signs.  -plan for formal abdominal US  -CBC, coags  - informed will decide on results    Pepe Mc MD, MPH

## 2024-08-06 NOTE — PROGRESS NOTES
Dr roldan in to see pt. Viewed pads from today assesed pt. Pt wants to go home. Will f/u in office wednesday

## 2024-08-06 NOTE — DISCHARGE INSTRUCTIONS
Postpartum Bleeding and Retained Placenta: Care Instructions  Overview  The placenta forms during pregnancy to give your baby nutrients and oxygen. It also removes waste products.  Normally, the placenta attaches to the top part of the uterus. Then it comes out of your body after the birth.  But sometimes, the placenta does not come out after the birth. This is called a retained placenta. When this happens, your doctor will remove the placenta.  Follow-up care is a key part of your treatment and safety. Be sure to make and go to all appointments, and call your doctor if you are having problems. It's also a good idea to know your test results and keep a list of the medicines you take.  How can you care for yourself at home?  Rest until you feel better.  After the birth, you will have a bloody discharge from the vagina. You may also pass some blood clots that shouldn't be bigger than an egg. Over the next 6 weeks or so, your bleeding should decrease a little every day and slowly change to a pinkish and then whitish discharge.  If you have a tear or stitches in your vaginal area, change your pad at least every 4 hours. This will prevent soreness and infection. Do not use tampons until your doctor says it's okay.  It's normal to have cramps for the first few days after the birth. They happen because the uterus is going back to normal size. Take an over-the-counter pain medicine, such as acetaminophen (Tylenol) or ibuprofen (Advil, Motrin). Read and follow all instructions on the label. Do not take aspirin. It can cause more bleeding.  Do not take two or more pain medicines at the same time unless the doctor told you to. Many pain medicines have acetaminophen, which is Tylenol. Too much acetaminophen (Tylenol) can be harmful.  When should you call for help?   Call 911  anytime you think you may need emergency care. For example, call if:    You passed out (lost consciousness).     You have chest pain, are short of

## 2024-08-06 NOTE — PROGRESS NOTES
Called radiology. Pt has retained products, called dr roldan and told her bleeding is min and vs stable.

## 2024-08-06 NOTE — PROGRESS NOTES
Talked with dr roldan, pt to walk with 30 min to see if bleeding increases. Gave new pad. Pt notwes she had a 6 cm clot in the toilet today then a 3 cm and filled I pad quickly.

## 2024-08-07 PROBLEM — Z3A.38 38 WEEKS GESTATION OF PREGNANCY: Status: RESOLVED | Noted: 2024-06-28 | Resolved: 2024-08-07
